# Patient Record
Sex: MALE | Race: WHITE | URBAN - METROPOLITAN AREA
[De-identification: names, ages, dates, MRNs, and addresses within clinical notes are randomized per-mention and may not be internally consistent; named-entity substitution may affect disease eponyms.]

---
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
## 2023-01-17 NOTE — TELEPHONE ENCOUNTER
Placed outgoing call to Sven's grandparents (who are his legal guardians) to discuss biopsy results from his 9/7/17 EGD; see copied results below.  Generic message left on grandparent's home voicemail.  Sven is a 17yo male with EoE diagnosed on EGD in 6/2017, who underwent follow up EGD while on omeprazole and swallowed fluticasone.  Minimal reactive changes seen in the esophagus, but no eosinophils.  Message left on home voicemail encouraging call back to discuss further.    After his procedure, I had briefly discussed with Sven's grandparents that swallowed fluticasone does work, but that the long-term management of EoE is identifying the specific food trigger(s) that cause the reaction.    Okay to continue meds as is for now; recommended follow up clinic appointment to discuss management plans.    Yary Farias MD MPH  Pediatric Gastroenterology  [facility]    Discussed with grandmother on 9/20/17. kp    Results:  SPECIMEN(S):   A: Duodenal biopsy   B: Antral biopsy   C: Esophageal biopsy, distal   D: Esophageal biopsy, proximal     FINAL DIAGNOSIS:   A.     Small intestine, duodenum, endoscopic biopsy:        - no diagnostic abnormality     B.     Stomach, antrum, endoscopic biopsy:        - mild chronic inflammation        - negative for H pylori by immunohistochemistry     C.     Esophagus, distal, endoscopic biopsy:        - minimal reactive changes     D.     Esophagus, proximal, endoscopic biopsy:        - minimal reactive changes     COMMENT:   In contrast to the previous esophageal biopsies ([id number], 6/7/17),   reviewed concurrently, the current esophageal biopsies show no   eosinophilic inflammation.   I have personally reviewed all specimens and/or slides, including the   listed special stains, and used them with my medical judgement to   determine or confirm the final diagnosis.      How Severe Is Your Skin Lesion?: mild Is This A New Presentation, Or A Follow-Up?: Skin Lesions Additional History: Patient denies anything new or changing that he’s aware of.

## (undated) DEVICE — ENDO TUBING W/CAP AUXILARY WATER INLET 100609 EGA-500

## (undated) DEVICE — KIT ENDO TURNOVER/PROCEDURE CARRY-ON 101822

## (undated) DEVICE — SUCTION MANIFOLD DORNOCH ULTRA CART UL-CL500

## (undated) DEVICE — ENDO BITE BLOCK PEDS BATRIK LATEX FREE B1

## (undated) DEVICE — SOL WATER IRRIG 1000ML BOTTLE 2F7114

## (undated) DEVICE — ENDO FORCEP ENDOJAW BIOPSY 2.8MMX230CM FB-220U

## (undated) DEVICE — SPECIMEN CONTAINER 60MLW/10% FORMALIN 59601

## (undated) DEVICE — SPECIMEN CONTAINER W/20ML 10% BUFF FORMALIN C4322-11

## (undated) DEVICE — TUBING SUCTION MEDI-VAC 1/4"X20' N620A

## (undated) DEVICE — KIT CONNECTOR FOR OLYMPUS ENDOSCOPES DEFENDO 100310

## (undated) DEVICE — WIPE PREMOIST CLEANSING WASHCLOTHS 7988

## (undated) DEVICE — ESU GROUND PAD INFANT W/CORD E7510-25

## (undated) RX ORDER — FENTANYL CITRATE 50 UG/ML
INJECTION, SOLUTION INTRAMUSCULAR; INTRAVENOUS
Status: DISPENSED
Start: 2017-06-07

## (undated) RX ORDER — FENTANYL CITRATE 50 UG/ML
INJECTION, SOLUTION INTRAMUSCULAR; INTRAVENOUS
Status: DISPENSED
Start: 2017-09-07

## (undated) RX ORDER — LIDOCAINE HYDROCHLORIDE 20 MG/ML
INJECTION, SOLUTION EPIDURAL; INFILTRATION; INTRACAUDAL; PERINEURAL
Status: DISPENSED
Start: 2017-09-07

## (undated) RX ORDER — PROPOFOL 10 MG/ML
INJECTION, EMULSION INTRAVENOUS
Status: DISPENSED
Start: 2017-09-07